# Patient Record
Sex: MALE | Employment: FULL TIME | URBAN - METROPOLITAN AREA
[De-identification: names, ages, dates, MRNs, and addresses within clinical notes are randomized per-mention and may not be internally consistent; named-entity substitution may affect disease eponyms.]

---

## 2020-02-19 ENCOUNTER — ANESTHESIA EVENT (OUTPATIENT)
Dept: SURGERY | Age: 35
End: 2020-02-19
Payer: SELF-PAY

## 2020-02-20 ENCOUNTER — HOSPITAL ENCOUNTER (OUTPATIENT)
Age: 35
Setting detail: OUTPATIENT SURGERY
Discharge: HOME | End: 2020-02-20
Attending: OPHTHALMOLOGY | Admitting: OPHTHALMOLOGY
Payer: SELF-PAY

## 2020-02-20 ENCOUNTER — ANESTHESIA (OUTPATIENT)
Dept: SURGERY | Age: 35
End: 2020-02-20
Payer: SELF-PAY

## 2020-02-20 VITALS
SYSTOLIC BLOOD PRESSURE: 118 MMHG | OXYGEN SATURATION: 100 % | HEART RATE: 68 BPM | DIASTOLIC BLOOD PRESSURE: 69 MMHG | HEIGHT: 61 IN | RESPIRATION RATE: 16 BRPM | TEMPERATURE: 98.5 F | BODY MASS INDEX: 22.62 KG/M2 | WEIGHT: 119.8 LBS

## 2020-02-20 PROCEDURE — 76210000021 HC REC RM PH II 0.5 TO 1 HR: Performed by: OPHTHALMOLOGY

## 2020-02-20 PROCEDURE — 77030013311: Performed by: OPHTHALMOLOGY

## 2020-02-20 PROCEDURE — 77030026916 HC ERAS HEMSTAT BVTC -B: Performed by: OPHTHALMOLOGY

## 2020-02-20 PROCEDURE — 77030002916 HC SUT ETHLN J&J -A: Performed by: OPHTHALMOLOGY

## 2020-02-20 PROCEDURE — 74011250636 HC RX REV CODE- 250/636: Performed by: NURSE ANESTHETIST, CERTIFIED REGISTERED

## 2020-02-20 PROCEDURE — 76010000162 HC OR TIME 1.5 TO 2 HR INTENSV-TIER 1: Performed by: OPHTHALMOLOGY

## 2020-02-20 PROCEDURE — 77030016693: Performed by: OPHTHALMOLOGY

## 2020-02-20 PROCEDURE — 77030026076 HC LNS MCSCP SPRVW DSP MCGM -B: Performed by: OPHTHALMOLOGY

## 2020-02-20 PROCEDURE — 77030007662 HC IMPL RETIN SPNG LBCO -B: Performed by: OPHTHALMOLOGY

## 2020-02-20 PROCEDURE — 74011250637 HC RX REV CODE- 250/637: Performed by: NURSE ANESTHETIST, CERTIFIED REGISTERED

## 2020-02-20 PROCEDURE — 77030018832 HC SOL IRR H20 ICUM -A: Performed by: OPHTHALMOLOGY

## 2020-02-20 PROCEDURE — 74011000250 HC RX REV CODE- 250: Performed by: OPHTHALMOLOGY

## 2020-02-20 PROCEDURE — 76060000034 HC ANESTHESIA 1.5 TO 2 HR: Performed by: OPHTHALMOLOGY

## 2020-02-20 PROCEDURE — 77030014436 HC IMPL RETIN SLV DTCH -A: Performed by: OPHTHALMOLOGY

## 2020-02-20 PROCEDURE — 76010000153 HC OR TIME 1.5 TO 2 HR: Performed by: OPHTHALMOLOGY

## 2020-02-20 PROCEDURE — 74011250636 HC RX REV CODE- 250/636: Performed by: OPHTHALMOLOGY

## 2020-02-20 DEVICE — STRIP RETIN SCLER BCKL 1.25X4 MM STYL 42 SIL: Type: IMPLANTABLE DEVICE | Site: EYE | Status: FUNCTIONAL

## 2020-02-20 DEVICE — IMPLANTABLE DEVICE: Type: IMPLANTABLE DEVICE | Status: FUNCTIONAL

## 2020-02-20 RX ORDER — FAMOTIDINE 20 MG/1
20 TABLET, FILM COATED ORAL ONCE
Status: COMPLETED | OUTPATIENT
Start: 2020-02-20 | End: 2020-02-20

## 2020-02-20 RX ORDER — PHENYLEPHRINE HYDROCHLORIDE 25 MG/ML
1 SOLUTION/ DROPS OPHTHALMIC
Status: COMPLETED | OUTPATIENT
Start: 2020-02-20 | End: 2020-02-20

## 2020-02-20 RX ORDER — DEXAMETHASONE SODIUM PHOSPHATE 4 MG/ML
INJECTION, SOLUTION INTRA-ARTICULAR; INTRALESIONAL; INTRAMUSCULAR; INTRAVENOUS; SOFT TISSUE AS NEEDED
Status: DISCONTINUED | OUTPATIENT
Start: 2020-02-20 | End: 2020-02-20 | Stop reason: HOSPADM

## 2020-02-20 RX ORDER — SODIUM CHLORIDE, SODIUM LACTATE, POTASSIUM CHLORIDE, CALCIUM CHLORIDE 600; 310; 30; 20 MG/100ML; MG/100ML; MG/100ML; MG/100ML
100 INJECTION, SOLUTION INTRAVENOUS CONTINUOUS
Status: DISCONTINUED | OUTPATIENT
Start: 2020-02-20 | End: 2020-02-20 | Stop reason: HOSPADM

## 2020-02-20 RX ORDER — PROPOFOL 10 MG/ML
INJECTION, EMULSION INTRAVENOUS AS NEEDED
Status: DISCONTINUED | OUTPATIENT
Start: 2020-02-20 | End: 2020-02-20 | Stop reason: HOSPADM

## 2020-02-20 RX ORDER — DEXAMETHASONE SODIUM PHOSPHATE 100 MG/10ML
INJECTION INTRAMUSCULAR; INTRAVENOUS AS NEEDED
Status: DISCONTINUED | OUTPATIENT
Start: 2020-02-20 | End: 2020-02-20 | Stop reason: HOSPADM

## 2020-02-20 RX ORDER — GENTAMICIN SULFATE 40 MG/ML
INJECTION, SOLUTION INTRAMUSCULAR; INTRAVENOUS AS NEEDED
Status: DISCONTINUED | OUTPATIENT
Start: 2020-02-20 | End: 2020-02-20 | Stop reason: HOSPADM

## 2020-02-20 RX ORDER — CYCLOPENTOLATE HYDROCHLORIDE 10 MG/ML
1 SOLUTION/ DROPS OPHTHALMIC
Status: COMPLETED | OUTPATIENT
Start: 2020-02-20 | End: 2020-02-20

## 2020-02-20 RX ORDER — MIDAZOLAM HYDROCHLORIDE 1 MG/ML
INJECTION, SOLUTION INTRAMUSCULAR; INTRAVENOUS AS NEEDED
Status: DISCONTINUED | OUTPATIENT
Start: 2020-02-20 | End: 2020-02-20 | Stop reason: HOSPADM

## 2020-02-20 RX ADMIN — PROPOFOL 50 MG: 10 INJECTION, EMULSION INTRAVENOUS at 16:54

## 2020-02-20 RX ADMIN — PHENYLEPHRINE HYDROCHLORIDE 1 DROP: 25 SOLUTION/ DROPS OPHTHALMIC at 13:52

## 2020-02-20 RX ADMIN — FAMOTIDINE 20 MG: 20 TABLET ORAL at 13:46

## 2020-02-20 RX ADMIN — BUPIVACAINE HYDROCHLORIDE AND EPINEPHRINE BITARTRATE: 7.5; .005 INJECTION, SOLUTION EPIDURAL; RETROBULBAR at 16:53

## 2020-02-20 RX ADMIN — PHENYLEPHRINE HYDROCHLORIDE 1 DROP: 25 SOLUTION/ DROPS OPHTHALMIC at 13:46

## 2020-02-20 RX ADMIN — CYCLOPENTOLATE HYDROCHLORIDE 1 DROP: 10 SOLUTION/ DROPS OPHTHALMIC at 13:52

## 2020-02-20 RX ADMIN — PHENYLEPHRINE HYDROCHLORIDE 1 DROP: 25 SOLUTION/ DROPS OPHTHALMIC at 14:14

## 2020-02-20 RX ADMIN — CYCLOPENTOLATE HYDROCHLORIDE 1 DROP: 10 SOLUTION/ DROPS OPHTHALMIC at 14:14

## 2020-02-20 RX ADMIN — CYCLOPENTOLATE HYDROCHLORIDE 1 DROP: 10 SOLUTION/ DROPS OPHTHALMIC at 13:47

## 2020-02-20 RX ADMIN — SODIUM CHLORIDE, SODIUM LACTATE, POTASSIUM CHLORIDE, AND CALCIUM CHLORIDE: 600; 310; 30; 20 INJECTION, SOLUTION INTRAVENOUS at 16:47

## 2020-02-20 RX ADMIN — DEXAMETHASONE SODIUM PHOSPHATE 10 MG: 10 INJECTION INTRAMUSCULAR; INTRAVENOUS at 18:04

## 2020-02-20 RX ADMIN — MIDAZOLAM 2 MG: 1 INJECTION INTRAMUSCULAR; INTRAVENOUS at 16:47

## 2020-02-20 RX ADMIN — PROPOFOL 20 MG: 10 INJECTION, EMULSION INTRAVENOUS at 16:56

## 2020-02-20 NOTE — DISCHARGE INSTRUCTIONS
Patient Education        Vitrectomía: Lavinia Curry en el hogar - [ Vitrectomy: What to Expect at Home ]  Dee recuperación    Buzzy Clamp es daniel operación para extraer el humor vítreo del medio del ian. El humor vítreo es un líquido espeso e incoloro parecido a un gel que llena el gran espacio en el medio del ian, detrás del cristalino. Ayuda a que el globo ocular mantenga dee forma. Raysa la [place], el médico utilizó instrumentos pequeños para extraer el humor vítreo. (Después de un tiempo, el ian produce líquido nuevo que vuelve a llenar el espacio). Luego, el médico puede ruthie tratado otros problemas oculares, blanco un desprendimiento de retina, daniel hemorragia vítrea (sangrado en el ian), tejido cicatricial en la retina, o desgarros o perforaciones en la mácula, daniel parte importante de la retina. La retina es la capa de tejido nervioso en la parte posterior del ian. Al final de la operación, el médico puede ruthie inyectado daniel burbuja de aceite o de gas en el ian. Esta presiona ligeramente la retina contra la pared del ian. Usted tendrá que mantener la veronica en daniel posición determinada raysa la mayor parte del día y la noche mientras el ian jose e. Si se utilizó daniel burbuja de [place], usted necesitará otra operación para extraer el aceite después de que el ian haya sanado. El ian puede estar hinchado, enrojecido o adolorido por varias semanas después de la [place]. Es posible que tenga algo de dolor en el ian y la vista borrosa raysa un par de días después de la operación. Necesitará entre 2 y 4 semanas para recuperarse antes de que pueda volver a hacer swapna actividades habituales. Puede jose carlos Tino Brink que la vista vuelva a la normalidad. Esta hoja de [employer] idea general del tiempo que le llevará recuperarse. Sin embargo, cada persona se recupera a un ritmo diferente. Siga los pasos que se mencionan a continuación para recuperarse lo más rápido posible.   ¿Cómo puede cuidarse en el hogar? Actividad    · Descanse cuando se sienta fatigado.     · Deje que el ian sane. No lolis actividades que puedan hacer que tenga que  la veronica. [place] incluye moverse rápidamente, levantar cosas pesadas o realizar actividades blanco limpieza o jardinería.     · Si el médico utilizó daniel burbuja de aceite o de gas para sostener la retina en dee lugar, mantenga la veronica en daniel determinada posición raysa la mayor parte del día y la noche por entre 1 y 3 semanas después de la [facility]. Lolis un plan para esta parte de dee recuperación, porque tendrá dificultad para hacer algunas actividades cotidianas. Dee médico le dará instrucciones específicas. ? No se acueste boca arriba o la burbuja se desplazará a la parte frontal del ian y presionará contra el cristalino en lugar de la retina.     · Si el médico utilizó daniel burbuja de gas, evite viajar en avión hasta que el médico le diga que es seguro. [place] se debe a que el cambio de altitud podría hacer que la burbuja de gas se expanda y aumente la presión en el interior del ian.     · Probablemente tendrá que ausentarse del trabajo por entre 2 y 4 semanas. [place] depende del tipo de trabajo que lolis y de cómo se sienta.     · Puede conducir cuando dee vista se lo permita. Si no está seguro, consulte a dee médico.   Alimentación    · Puede seguir dee dieta normal. Si tiene [employer], pruebe alimentos suaves y bajos en grasa, blanco arroz sin condimentar, reba asado, pan faye y yogur. Medicamentos    · Dee médico le dirá si puede comenzar a jose carlos swapna medicamentos de nuevo y cuándo puede hacerlo. Él o ken también le dará instrucciones acerca de jose carlos cualquier medicamento nuevo.     · Si luis aspirina o algún otro medicamento que previene la formación de coágulos de [place], asegúrese de hablar con dee médico. Él o ken le dirá si puede comenzar a jose carlos minh medicamento de nuevo y cuándo puede hacerlo. Asegúrese de entender exactamente lo que dee médico quiere que lolis.   · Sea siddharth con los medicamentos. Vanita y siga todas las instrucciones de la [place]. ? Si el médico le recetó un analgésico (medicamento para el dolor), tómelo según las indicaciones. ? Si no está tomando un analgésico recetado, pregúntele a dee médico si puede jose carlos imtiaz de [employer].     · Tendrá que usar gotas para los ojos por hasta 6 semanas. Hielo y elevación    · Cierre el ian y colóquese hielo o daniel compresa fría sobre él por entre 10 y 21 minutos [id number] [place]. Trate de hacer esto cada 1 o 2 horas raysa los siguientes 3 días (cuando esté despierto) o hasta que baje la hinchazón. Póngase un paño wynn entre el hielo y la piel. Otras instrucciones    · Puede ducharse y lavarse el pelo y la khushboo. Loreta no deje que le entre jabón en el ian. Elliot vez desee usar daniel toallita cuando se lave la khushboo. Algunas personas usan gafas para nadar.     · Lleve puestos anteojos de sol raysa el día. Es posible que tenga que llevar puesto un parche o protector ocular raysa algunos días. La atención de seguimiento es daniel parte clave de dee tratamiento y seguridad. Asegúrese de hacer y acudir a todas las citas, y llame a dee médico si está teniendo problemas. También es daniel buena idea saber los resultados de los exámenes y mantener daniel lista de los medicamentos que luis. ¿Cuándo debe pedir ayuda? Llame al 911 en cualquier momento que considere que necesita atención de [place]. Por ejemplo, llame si:    · Tiene daniel pérdida repentina de la visión.     · Tiene dolor intenso en el ian.    Llame a dee médico ahora mismo o busque atención médica inmediata si:    · Dee visión empeora.     · Tiene un nuevo dolor en el ian o el dolor empeora.     · Tiene síntomas de infección en el ian, tales blanco:  ? Pus o daniel secreción espesa que sale del ian. ? Enrojecimiento o hinchazón alrededor del ian. ? Nicholette Ip.     · Tiene cambios en la visión o ve moscas volantes o destellos nuevos.  (Los destellos son las \"chispas\" que es posible que dave cuando mueve la veronica. Las moscas volantes son sombras u objetos oscuros que \"flotan\" a través de dee quinn de visión).    Preste especial atención a los cambios en dee michael y asegúrese de comunicarse con dee médico si:    · No mejora blanco se esperaba. ¿Dónde puede encontrar más información en inglés? Britton Navarro a [email or web address] Alondra Hassan [id number] en la búsqueda para aprender Jordan Mackey de \"Vitrectomía: Too Pickard en el [facility] - [ Vitrectomy: What to Expect at [facility] ]. \"  Revisado: [address] [place], 2019  Versión del contenido: 12.2  © 0833-9329 [employer]wise[employer]. Las instrucciones de cuidado fueron adaptadas bajo licencia por [employer] (which disclaims liability or warranty for this information). Si usted tiene [place] afección médica o sobre estas instrucciones, siempre pregunte a dee profesional de michael. Healthwise, Incorporated niega toda garantía o responsabilidad por dee uso de esta información. DISCHARGE SUMMARY from Nurse    PATIENT INSTRUCTIONS:    After general anesthesia or intravenous sedation, for 24 hours or while taking prescription Narcotics:  · Limit your activities  · Do not drive and operate hazardous machinery  · Do not make important personal or business decisions  · Do  not drink alcoholic beverages  · If you have not urinated within 8 hours after discharge, please contact your surgeon on call.     Report the following to your surgeon:  · Excessive pain, swelling, redness or odor of or around the surgical area  · Temperature over 100.5  · Nausea and vomiting lasting longer than 4 hours or if unable to take medications  · Any signs of decreased circulation or nerve impairment to extremity: change in color, persistent  numbness, tingling, coldness or increase pain  · Any questions    These are general instructions for a healthy lifestyle:    No smoking/ No tobacco products/ Avoid exposure to second hand smoke  Surgeon Pedro Beth Warning:  Quitting smoking now greatly reduces serious risk to your health. Obesity, smoking, and sedentary lifestyle greatly increases your risk for illness    A healthy diet, regular physical exercise & weight monitoring are important for maintaining a healthy lifestyle    You may be retaining fluid if you have a history of heart failure or if you experience any of the following symptoms:  Weight gain of 3 pounds or more overnight or 5 pounds in a week, increased swelling in our hands or feet or shortness of breath while lying flat in bed. Please call your doctor as soon as you notice any of these symptoms; do not wait until your next office visit. The discharge information has been reviewed with the patient. The patient verbalized understanding. Discharge medications reviewed with the patient and appropriate educational materials and side effects teaching were provided. ___________________________________________________________________________________________________________________________________  Patient armband removed and given to patient to take home.   Patient was informed of the privacy risks if armband lost or stolen

## 2020-02-20 NOTE — PERIOP NOTES
1) Interpret for pt : Franne Lefort    2) Garret Sleeper, Alli Jiménez, will return to  [phone]    Pre-Op Summary    Pt arrived via car with family/friend and is oriented to time, place, person and situation. Patient with steady gait with none assistive devices. Visit Vitals  /67 (BP Patient Position: At rest)   Pulse (!) 45   Temp 97.3 °F (36.3 °C)   Resp 16   Ht 5' 1\" (1.549 m)   Wt 54.3 kg (119 lb 12.8 oz)   SpO2 100%   BMI 22.64 kg/m²       Peripheral IV located on Left hand . Patients belongings are located [address]. Patient's point of contact is Albert Perry  and their contact number is: [phone]. They will be leaving and coming back. They are able to receive medication information. They will be their ride home.

## 2020-02-20 NOTE — ANESTHESIA PREPROCEDURE EVALUATION
Relevant Problems   No relevant active problems       Anesthetic History   No history of anesthetic complications            Review of Systems / Medical History  Patient summary reviewed, nursing notes reviewed and pertinent labs reviewed    Pulmonary  Within defined limits                 Neuro/Psych   Within defined limits           Cardiovascular  Within defined limits                     GI/Hepatic/Renal  Within defined limits              Endo/Other  Within defined limits      Anemia     Other Findings            Physical Exam    Airway  Mallampati: II  TM Distance: > 6 cm  Neck ROM: normal range of motion   Mouth opening: Normal     Cardiovascular    Rhythm: regular  Rate: normal         Dental         Pulmonary  Breath sounds clear to auscultation               Abdominal  GI exam deferred       Other Findings            Anesthetic Plan    ASA: 2  Anesthesia type: MAC          Induction: Intravenous  Anesthetic plan and risks discussed with: Patient

## 2020-02-21 NOTE — ANESTHESIA POSTPROCEDURE EVALUATION
Procedure(s):  Left Eye repair of retinal detachment with VITRECTOMY POSTERIOR 23 GAUGE, air/gas,silicon oil, possible  SCLERAL BUCKLE, endolaser all indicated procedures. MAC    Anesthesia Post Evaluation      Multimodal analgesia: multimodal analgesia used between 6 hours prior to anesthesia start to PACU discharge  Patient location during evaluation: bedside  Patient participation: complete - patient participated  Level of consciousness: awake  Pain management: adequate  Airway patency: patent  Anesthetic complications: no  Cardiovascular status: stable  Respiratory status: acceptable  Hydration status: acceptable  Post anesthesia nausea and vomiting:  controlled      No vitals data found for the desired time range.

## (undated) DEVICE — 25 GA FINE TIP: Brand: WET-FIELD® ERASER®

## (undated) DEVICE — Device

## (undated) DEVICE — DISH PTRI STRL --

## (undated) DEVICE — FILTER MILLEX-GP 33MM 0.22 MIC --

## (undated) DEVICE — 3M™ TEGADERM™ TRANSPARENT FILM DRESSING FRAME STYLE, 1624W, 2-3/8 IN X 2-3/4 IN (6 CM X 7 CM), 100/CT 4CT/CASE: Brand: 3M™ TEGADERM™

## (undated) DEVICE — NEEDLE HYPO 25GA L1.5IN BLU POLYPR HUB S STL REG BVL STR

## (undated) DEVICE — SUT ETHLN 5-0 18IN RD1 DA BLK --

## (undated) DEVICE — SOLUTION IRRIGATION H2O 0797305] ICU MEDICAL INC]

## (undated) DEVICE — CANNULA ANTR CHMBR OPHTH WASHOUT 19GA

## (undated) DEVICE — NEEDLE HYPO 27GA L0.5IN GRY POLYPR HUB S STL REG BVL STR

## (undated) DEVICE — STERILE POLYISOPRENE POWDER-FREE SURGICAL GLOVES: Brand: PROTEXIS

## (undated) DEVICE — SUPER VIEW® STERILE LENS PACK FOR USE WITH THE SUPER VIEW® SYSTEM AND THE BIOM®: Brand: SUPER VIEW® DISPOSABLE LENS SET

## (undated) DEVICE — FILTER NEEDLE: Brand: MONOJECT

## (undated) DEVICE — STERILE LATEX POWDER-FREE SURGICAL GLOVESWITH NITRILE COATING: Brand: PROTEXIS

## (undated) DEVICE — NEEDLE HYPO 30GA L0.5IN BGE POLYPR HUB S STL REG BVL STR

## (undated) DEVICE — OPHTHALMIC PK ACCURS 25G CUST --

## (undated) DEVICE — MICROSURGICAL INSTRUMENT 25GA SOFT TIP NEEDLE: Brand: ALCON

## (undated) DEVICE — SURGICAL PROCEDURE PACK VITRECTOMY EYE CUST